# Patient Record
Sex: FEMALE | Race: WHITE | NOT HISPANIC OR LATINO
[De-identification: names, ages, dates, MRNs, and addresses within clinical notes are randomized per-mention and may not be internally consistent; named-entity substitution may affect disease eponyms.]

---

## 2017-01-09 RX ORDER — LOSARTAN POTASSIUM 50 MG/1
TABLET ORAL
Qty: 90 TABLET | Refills: 1 | Status: SHIPPED | OUTPATIENT
Start: 2017-01-09 | End: 2017-06-21

## 2017-01-09 NOTE — TELEPHONE ENCOUNTER
called requesting refill of pt's medication (med pended). I verified pharmacy as listed in chart. LOV: 11/2/16- wellness  Last labs: 12/30/16  Last rx: 10/10/16- 90 with 0   No future appointments. Per protocol to provider.

## 2017-01-15 ENCOUNTER — HOSPITAL (OUTPATIENT)
Dept: OTHER | Age: 72
End: 2017-01-15
Attending: EMERGENCY MEDICINE

## 2017-06-21 ENCOUNTER — OFFICE VISIT (OUTPATIENT)
Dept: INTERNAL MEDICINE CLINIC | Facility: CLINIC | Age: 72
End: 2017-06-21

## 2017-06-21 VITALS
WEIGHT: 157 LBS | RESPIRATION RATE: 16 BRPM | TEMPERATURE: 98 F | HEART RATE: 72 BPM | HEIGHT: 65 IN | SYSTOLIC BLOOD PRESSURE: 134 MMHG | BODY MASS INDEX: 26.16 KG/M2 | DIASTOLIC BLOOD PRESSURE: 84 MMHG

## 2017-06-21 DIAGNOSIS — I10 ESSENTIAL HYPERTENSION: Primary | ICD-10-CM

## 2017-06-21 PROCEDURE — 99213 OFFICE O/P EST LOW 20 MIN: CPT | Performed by: FAMILY MEDICINE

## 2017-06-21 RX ORDER — LOSARTAN POTASSIUM 50 MG/1
TABLET ORAL
Qty: 90 TABLET | Refills: 1 | Status: SHIPPED | OUTPATIENT
Start: 2017-06-21 | End: 2017-12-29

## 2017-06-21 NOTE — PROGRESS NOTES
HPI:    Patient ID: Martinez Deras is a 67year old female. HPI Here for f/u on BP. Patient is taking med as prescribed with no issues. No complaints. Hasn't scheduled colonoscopy yet but plans to soon.      Review of Systems   Constitutional: Dale Hillman

## 2017-06-21 NOTE — PATIENT INSTRUCTIONS
What is High Blood Pressure? High blood pressure (also called hypertension) is known as the “silent killer.” This is because most of the time it doesn’t cause symptoms. In fact, many people don’t know they have it until other problems develop.  In most c If your blood pressure is too high, work with your doctor on a plan for lowering it. Below are steps you can take that will help lower your blood pressure. · Choose heart-healthy foods. Eating healthier meals helps you control your blood pressure.  Ask you · Medicine is only one part of controlling high blood pressure. You also need to manage your weight, get regular exercise, and adjust your eating habits. · High blood pressure is usually a lifelong problem.  But it can be controlled with healthy lifestyle

## 2017-08-01 ENCOUNTER — TELEPHONE (OUTPATIENT)
Dept: INTERNAL MEDICINE CLINIC | Facility: CLINIC | Age: 72
End: 2017-08-01

## 2017-08-01 ENCOUNTER — OFFICE VISIT (OUTPATIENT)
Dept: INTERNAL MEDICINE CLINIC | Facility: CLINIC | Age: 72
End: 2017-08-01

## 2017-08-01 VITALS
BODY MASS INDEX: 26.01 KG/M2 | HEART RATE: 80 BPM | HEIGHT: 65.5 IN | WEIGHT: 158 LBS | DIASTOLIC BLOOD PRESSURE: 80 MMHG | SYSTOLIC BLOOD PRESSURE: 140 MMHG | TEMPERATURE: 98 F

## 2017-08-01 DIAGNOSIS — R21 RASH: Primary | ICD-10-CM

## 2017-08-01 PROCEDURE — 99213 OFFICE O/P EST LOW 20 MIN: CPT | Performed by: FAMILY MEDICINE

## 2017-08-01 RX ORDER — METHYLPREDNISOLONE 4 MG/1
TABLET ORAL
Qty: 1 KIT | Refills: 0 | Status: SHIPPED | OUTPATIENT
Start: 2017-08-01 | End: 2017-08-11

## 2017-08-01 NOTE — TELEPHONE ENCOUNTER
Patient was picking weeds yesterday, and now has a rash. The same thing happened in Sept when she was weeding, and AMS saw her and gave her Medrol pack. Patient believes she needs the same medication.   Can it be phoned in, AMS has no appts today or tomor

## 2017-08-01 NOTE — TELEPHONE ENCOUNTER
She should be seen in office. You can put her anywhere today and there are openings tomorrow as well.

## 2017-08-02 NOTE — PROGRESS NOTES
HPI:    Patient ID: Clover Parr is a 67year old female. HPI Here with c/o itchy rash on arm and face for 2 days. Was trimming bushes and out in the yard working all weekend and noticed spots arising.  Had this same rash last year after working in methylPREDNISolone (MEDROL) 4 MG Oral Tablet Therapy Pack 1 kit 0      Sig: As directed.            Imaging & Referrals:  None       [id number]

## 2017-08-08 ENCOUNTER — TELEPHONE (OUTPATIENT)
Dept: INTERNAL MEDICINE CLINIC | Facility: CLINIC | Age: 72
End: 2017-08-08

## 2017-08-08 NOTE — TELEPHONE ENCOUNTER
Pt took her last pill of methylPREDNISolone (MEDROL) 4 MG Oral Tablet Therapy Pack yesterday for a rash she had-was to call with update-rash on her R arm still there but much smaller-no fluid buildup anymore on rash-much better

## 2017-08-09 NOTE — TELEPHONE ENCOUNTER
Patient calling back today stating the rash is not fully gone, it is not raised, but not all the way gone. Would like call back.

## 2017-08-11 RX ORDER — METHYLPREDNISOLONE 4 MG/1
TABLET ORAL
Qty: 1 KIT | Refills: 0 | Status: SHIPPED | OUTPATIENT
Start: 2017-08-11 | End: 2017-11-08

## 2017-08-11 NOTE — TELEPHONE ENCOUNTER
LOV 8/1/17   Pt states that she completed her dose on Tues 8/8/17. Pt's rash on face is gone, the rash on right hand scabbed and dried and the one on her wrist is still there. Pt states that it is still pink, slightly raised like a mosquito bite.  Denies an

## 2017-08-11 NOTE — TELEPHONE ENCOUNTER
Called pt to advise info per AMS  Pt verbalized understanding, agreed with POC to complete full course again and had no further questions.

## 2017-10-26 RX ORDER — CELECOXIB 200 MG/1
CAPSULE ORAL
Qty: 90 CAPSULE | Refills: 3 | Status: SHIPPED | OUTPATIENT
Start: 2017-10-26 | End: 2018-01-01

## 2017-10-26 NOTE — TELEPHONE ENCOUNTER
LOV-8/1/17  FOV-11/08/17  LAST RX-10/14/16 90 tabs 3 refills  LAST LABS-12/30/16  PER PROTOCOL-to provider

## 2017-11-08 ENCOUNTER — OFFICE VISIT (OUTPATIENT)
Dept: INTERNAL MEDICINE CLINIC | Facility: CLINIC | Age: 72
End: 2017-11-08

## 2017-11-08 VITALS
TEMPERATURE: 98 F | RESPIRATION RATE: 16 BRPM | WEIGHT: 157 LBS | HEIGHT: 65.5 IN | HEART RATE: 70 BPM | SYSTOLIC BLOOD PRESSURE: 122 MMHG | DIASTOLIC BLOOD PRESSURE: 70 MMHG | BODY MASS INDEX: 25.84 KG/M2

## 2017-11-08 DIAGNOSIS — I10 ESSENTIAL HYPERTENSION: ICD-10-CM

## 2017-11-08 DIAGNOSIS — Z00.00 ENCOUNTER FOR ANNUAL HEALTH EXAMINATION: Primary | ICD-10-CM

## 2017-11-08 PROCEDURE — G0008 ADMIN INFLUENZA VIRUS VAC: HCPCS | Performed by: FAMILY MEDICINE

## 2017-11-08 PROCEDURE — G0439 PPPS, SUBSEQ VISIT: HCPCS | Performed by: FAMILY MEDICINE

## 2017-11-08 PROCEDURE — 90653 IIV ADJUVANT VACCINE IM: CPT | Performed by: FAMILY MEDICINE

## 2017-11-08 NOTE — PATIENT INSTRUCTIONS
Joaquín Marcus's SCREENING SCHEDULE   Tests on this list are recommended by your physician but may not be covered, or covered at this frequency, by your insurer. Please check with your insurance carrier before scheduling to verify coverage.    PREVENT lifetime   • Anyone with a family history    Colorectal Cancer Screening  Covered up to Age 76     Colonoscopy Screen   Covered every 10 years- more often if abnormal Colonoscopy,10 Years due on 01/06/2016 Update Health Maintenance if applicable    Flex Si visit on 10/05/09  -INFLUENZA VIRUS VACCINE, >=1YEARS OF AGE    Please get every year    Pneumococcal 13 (Prevnar)  Covered Once after 65   Orders placed or performed in visit on 06/12/15  -PNEUMOCOCCAL VACC, 13 MICHELLE IM    Please get once after your 65th b from the [facility] regarding Advance Directives.

## 2017-11-08 NOTE — PROGRESS NOTES
HPI:   Marlinda Romberg is a 67year old female who presents for a Medicare Subsequent Annual Wellness visit (Pt already had Initial Annual Wellness). Here for annual check-up. Patient has no complaints. Taking BP med as prescribed with no issues. has a past surgical history that includes special service or report (11/03) and other surgical history (2003). Her family history includes Heart Disorder in her father, maternal grandfather, maternal grandmother, and mother.    SOCIAL HISTORY:   She  rep Supple, symmetrical, trachea midline, no adenopathy;  thyroid: not enlarged, symmetric, no tenderness/mass/nodules; no carotid bruit or JVD   Back:   Symmetric, no curvature, ROM normal, no CVA tenderness   Lungs:   Clear to auscultation bilaterally, respi get our office a copy of it for scanning into [address] [place] [address] on file in Epic:    Magui Morris does not have a Power of  for [employer] on file in [address] [place] [address] Rd.  The patient has this document but we do not have it in Epic, and Little interest or pleasure in doing things (over the last two weeks)?: Not at all    Feeling down, depressed, or hopeless (over the last two weeks)?: Not at all    PHQ-2 SCORE: 0        Advance Directives     Do you have a healthcare power of ?: this or any previous visit. No flowsheet data found. Pap and Pelvic      Pap: Every 3 yrs age 21-68 or Pap+HPV every 5 yrs age 33-67, age 72 and older at high risk There are no preventive care reminders to display for this patient.  Update [employer] [facility] 10/04/2014 0.72     CREATININE (mg/dL)   Date Value   12/30/2016 0.71    No flowsheet data found.     BUN  Annually Blood Urea Nitrogen (mg/dL)   Date Value   10/04/2014 21     UREA NITROGEN (BUN) (mg/dL)   Date Value   12/30/2016 31 (H)    No flowsheet d

## 2017-12-29 RX ORDER — LOSARTAN POTASSIUM 50 MG/1
TABLET ORAL
Qty: 90 TABLET | Refills: 1 | Status: SHIPPED | OUTPATIENT
Start: 2017-12-29 | End: 2018-01-01

## 2017-12-29 NOTE — TELEPHONE ENCOUNTER
E request  Medication(s) to Refill:   Pending Prescriptions Disp Refills    LOSARTAN POTASSIUM 50 MG Oral Tab [Pharmacy Med Name: LOSARTAN 50MG TAB] 90 tablet 1     Sig: TAKE ONE TABLET BY MOUTH ONCE DAILY           Last Time Medication was Filled:  06/21/

## 2018-01-01 ENCOUNTER — OFFICE VISIT (OUTPATIENT)
Dept: INTERNAL MEDICINE CLINIC | Facility: CLINIC | Age: 73
End: 2018-01-01
Payer: MEDICARE

## 2018-01-01 ENCOUNTER — OFFICE VISIT (OUTPATIENT)
Dept: INTERNAL MEDICINE CLINIC | Facility: CLINIC | Age: 73
End: 2018-01-01

## 2018-01-01 ENCOUNTER — HOSPITAL ENCOUNTER (OUTPATIENT)
Dept: BONE DENSITY | Age: 73
Discharge: HOME | End: 2018-01-01
Attending: FAMILY MEDICINE
Payer: MEDICARE

## 2018-01-01 VITALS
TEMPERATURE: 98 F | RESPIRATION RATE: 18 BRPM | HEART RATE: 86 BPM | HEIGHT: 65.5 IN | SYSTOLIC BLOOD PRESSURE: 136 MMHG | WEIGHT: 158 LBS | BODY MASS INDEX: 26.01 KG/M2 | DIASTOLIC BLOOD PRESSURE: 82 MMHG

## 2018-01-01 VITALS
WEIGHT: 155 LBS | BODY MASS INDEX: 25 KG/M2 | SYSTOLIC BLOOD PRESSURE: 122 MMHG | HEART RATE: 88 BPM | RESPIRATION RATE: 15 BRPM | DIASTOLIC BLOOD PRESSURE: 80 MMHG

## 2018-01-01 VITALS
SYSTOLIC BLOOD PRESSURE: 122 MMHG | DIASTOLIC BLOOD PRESSURE: 70 MMHG | WEIGHT: 158 LBS | RESPIRATION RATE: 16 BRPM | TEMPERATURE: 98 F | BODY MASS INDEX: 26 KG/M2 | HEART RATE: 60 BPM

## 2018-01-01 DIAGNOSIS — I10 ESSENTIAL HYPERTENSION: Primary | ICD-10-CM

## 2018-01-01 DIAGNOSIS — Z13.220 SCREENING, LIPID: ICD-10-CM

## 2018-01-01 DIAGNOSIS — R21 RASH: Primary | ICD-10-CM

## 2018-01-01 DIAGNOSIS — I10 ESSENTIAL HYPERTENSION, BENIGN: ICD-10-CM

## 2018-01-01 DIAGNOSIS — M81.8 OTHER OSTEOPOROSIS WITHOUT CURRENT PATHOLOGICAL FRACTURE: ICD-10-CM

## 2018-01-01 DIAGNOSIS — Z00.00 ENCOUNTER FOR ANNUAL HEALTH EXAMINATION: Primary | ICD-10-CM

## 2018-01-01 PROCEDURE — G0439 PPPS, SUBSEQ VISIT: HCPCS | Performed by: FAMILY MEDICINE

## 2018-01-01 PROCEDURE — 99213 OFFICE O/P EST LOW 20 MIN: CPT | Performed by: FAMILY MEDICINE

## 2018-01-01 PROCEDURE — 77080 DXA BONE DENSITY AXIAL: CPT | Performed by: FAMILY MEDICINE

## 2018-01-01 RX ORDER — LOSARTAN POTASSIUM 50 MG/1
TABLET ORAL
Qty: 90 TABLET | Refills: 1 | Status: SHIPPED | OUTPATIENT
Start: 2018-01-01 | End: 2018-01-01

## 2018-01-01 RX ORDER — METHYLPREDNISOLONE 4 MG/1
TABLET ORAL
Qty: 1 KIT | Refills: 0 | Status: SHIPPED | OUTPATIENT
Start: 2018-01-01

## 2018-01-01 RX ORDER — CELECOXIB 200 MG/1
CAPSULE ORAL
Qty: 90 CAPSULE | Refills: 3 | Status: SHIPPED | OUTPATIENT
Start: 2018-01-01 | End: 2019-01-01

## 2018-01-01 RX ORDER — LOSARTAN POTASSIUM 50 MG/1
TABLET ORAL
Qty: 90 TABLET | Refills: 1 | Status: SHIPPED | OUTPATIENT
Start: 2018-01-01

## 2018-01-15 PROBLEM — M70.61 TROCHANTERIC BURSITIS OF RIGHT HIP: Status: ACTIVE | Noted: 2018-01-15

## 2018-03-21 ENCOUNTER — HOSPITAL (OUTPATIENT)
Dept: OTHER | Age: 73
End: 2018-03-21
Attending: OBSTETRICS & GYNECOLOGY

## 2018-06-06 NOTE — PROGRESS NOTES
HPI:    Patient ID: Barb Jensen is a 75year old female. HPI Here for f/u on BP. Patient is taking Losartan as prescribed with no issues. Doesn't check BP regularly but no headaches/visual changes/chest pain/shortness of breath.    Hasn't had DEXA Visit:  No prescriptions requested or ordered in this encounter    Imaging & Referrals:  XR DEXA BONE DENSITOMETRY (CPT=[id number])       JX#[id number]

## 2018-07-20 NOTE — PROGRESS NOTES
HPI:    Patient ID: Harvie Dancer is a 75year old female. HPI Here with itchy rash x one day. Patient was weeding 2 days ago and started with itchy red spots on chin and neck the next day. Has taken benadryl which helps a little.        Review of S

## 2018-10-20 NOTE — TELEPHONE ENCOUNTER
E request  LOV: 7/20/18- RASH  Last rx: 10/26/17- 90 capsules with 3 refills  Future Appointments   Date Time Provider Alli Dove   11/12/2018 10:00 AM Ernesto Cary DO [facility]

## 2018-11-12 PROBLEM — M70.61 TROCHANTERIC BURSITIS OF RIGHT HIP: Status: RESOLVED | Noted: 2018-01-15 | Resolved: 2018-01-01

## 2018-11-12 NOTE — PROGRESS NOTES
HPI:   Juliet Browning is a 75year old female who presents for a Medicare Subsequent Annual Wellness visit (Pt already had Initial Annual Wellness). Taking BP med as prescribed with no issues. Her last annual assessment has been over 1 year:  Deni Serna Encounters:  11/12/18 : 158 lb  07/20/18 : 158 lb  06/06/18 : 155 lb     Last Cholesterol Labs:   Lab Results   Component Value Date    CHOLEST 161 11/05/2018    HDL 59 11/05/2018    LDL 85 11/05/2018    TRIG 76 11/05/2018          Last Chemistry Labs:   L feels well otherwise  SKIN: denies any unusual skin lesions  EYES: denies blurred vision or double vision  HEENT: denies nasal congestion, sinus pain or ST  LUNGS: denies shortness of breath with exertion  CARDIOVASCULAR: denies chest pain on exertion  GI: Extremities normal, atraumatic, no cyanosis or edema   Pulses: 2+ and symmetric   Skin: Skin color, texture, turgor normal, no rashes or lesions   Lymph nodes: Cervical, supraclavicular, and axillary nodes normal   Neurologic: Normal       Vaccination Hist Diabetes Screening      HbgA1C   Annually No results found for: A1C No flowsheet data found.     Fasting Blood Sugar (FSB)Annually Glucose (mg/dL)   Date Value   10/04/2014 85     GLUCOSE (mg/dL)   Date Value   11/05/2018 84          Cardiovascular Diseas your 65th birthday    Hepatitis B for Moderate/High Risk No vaccine history found Medium/high risk factors:   End-stage renal disease   Hemophiliacs who received Factor VIII or IX concentrates   Clients of institutions for the mentally retarded   Persons w

## 2018-11-12 NOTE — PATIENT INSTRUCTIONS
Verda Boxer Wittosch's SCREENING SCHEDULE   Tests on this list are recommended by your physician but may not be covered, or covered at this frequency, by your insurer. Please check with your insurance carrier before scheduling to verify coverage.    PREVENT family history    Colorectal Cancer Screening  Covered up to Age 76     Colonoscopy Screen   Covered every 10 years- more often if abnormal There are no preventive care reminders to display for this patient.  Update Outcome Referrals if applicable    Flex performed in visit on 10/05/09   • INFLUENZA VIRUS VACCINE, >=1YEARS OF AGE    Please get every year    Pneumococcal 13 (Prevnar)  Covered Once after 65 Orders placed or performed in visit on 06/12/15   • PNEUMOCOCCAL VACC, 13 MICHELLE IM    Please get once af information from the [address] [place] [address] regarding Advance Directives.

## 2018-12-28 NOTE — TELEPHONE ENCOUNTER
E request  LOV: 11/12/18  Last labs: 11/5/18  Last rx: 6/18/18- 90 tablets with 1 refill  No future appointments.   Per protocol to provider

## 2019-01-01 ENCOUNTER — TELEPHONE (OUTPATIENT)
Dept: FAMILY MEDICINE CLINIC | Facility: CLINIC | Age: 74
End: 2019-01-01

## 2019-01-01 DIAGNOSIS — Z82.49 FAMILY HISTORY OF ATRIAL FIBRILLATION: Primary | ICD-10-CM

## 2019-01-01 RX ORDER — CELECOXIB 200 MG/1
CAPSULE ORAL
Qty: 90 CAPSULE | Refills: 3 | Status: SHIPPED | OUTPATIENT
Start: 2019-01-01

## 2019-04-17 ENCOUNTER — TELEPHONE (OUTPATIENT)
Dept: INTERNAL MEDICINE CLINIC | Facility: CLINIC | Age: 74
End: 2019-04-17

## 2019-04-17 NOTE — TELEPHONE ENCOUNTER
Received fax from [address] [place] [address] in [place] emergency room. pt was found unresponsive at home. CPR was started, attempted for 32 min. Pt found to be in asystole. Pronounced at 9:25 pm. Placed in AMS bin for review.

## 2019-04-17 NOTE — TELEPHONE ENCOUNTER
[address] and spoke with Edna Bui. Advised that AMS will not sign the death certificate and that  should sign since pt doesn't have a clear cause of death. He verbalized understanding.

## 2019-04-17 NOTE — TELEPHONE ENCOUNTER
[address] [facility], [phone]  Is asking AMS will be signing the death certificate. The ED doctor won't sign it.

## 2019-04-17 NOTE — TELEPHONE ENCOUNTER
Paged by RN at [facility] ER stating that pt came in unresponsive and found to be in asystole. They were unable to resuscitate her. They wanted to inform PCP. Instructed RN to have ER physician sign the death certificate.

## (undated) NOTE — MR AVS SNAPSHOT
[facility] [address] [phone]               Thank you for choosing us for your health care visit with Ty Garcia DO.   We are glad to serve you and happy to provide you with this summa walls. This causes damage to the artery walls and then the formation of scar tissue as it heals. This makes the arteries stiff and weak. Plaque sticks to the scarred tissue narrowing and hardening the arteries.  High blood pressure also causes your heart to have high blood pressure. Losing excess weight helps lower blood pressure. · Exercise regularly. Daily exercise helps your heart and blood vessels work better and stay healthier. It can help lower your blood pressure. · Stop smoking.  Smoking increases bl Current Medications          This list is accurate as of: 6/21/17 11:55 AM.  Always use your most recent med list.                celecoxib 200 MG Caps   One cap daily   Commonly known as:  CELEBREX           Clobetasol Propionate 0.05 % Soln   Commonly kn including white bread, rice and pasta   Eat plenty of protein, keep the fat content low Sugars:  sodas and sports drinks, candies and desserts   Eat plenty of low-fat dairy products High fat meats and dairy   Choose whole grain products Foods high in sodiu